# Patient Record
Sex: MALE | Race: WHITE | NOT HISPANIC OR LATINO | Employment: OTHER | URBAN - NONMETROPOLITAN AREA
[De-identification: names, ages, dates, MRNs, and addresses within clinical notes are randomized per-mention and may not be internally consistent; named-entity substitution may affect disease eponyms.]

---

## 2023-10-27 RX ORDER — HYDROCHLOROTHIAZIDE 12.5 MG/1
12.5 TABLET ORAL DAILY
COMMUNITY

## 2023-10-27 RX ORDER — METOPROLOL TARTRATE 100 MG/1
100 TABLET ORAL 2 TIMES DAILY
COMMUNITY

## 2023-10-27 RX ORDER — FAMOTIDINE 20 MG/1
20 TABLET, FILM COATED ORAL 2 TIMES DAILY
COMMUNITY
Start: 2023-08-15

## 2023-10-27 RX ORDER — MAGNESIUM 200 MG
1 TABLET ORAL DAILY
COMMUNITY

## 2023-10-27 RX ORDER — ALBUTEROL SULFATE 90 UG/1
2 AEROSOL, METERED RESPIRATORY (INHALATION) EVERY 6 HOURS PRN
COMMUNITY

## 2023-10-27 RX ORDER — NITROGLYCERIN 0.4 MG/1
TABLET SUBLINGUAL
COMMUNITY
Start: 2022-05-17

## 2023-10-27 RX ORDER — MULTIVIT WITH MINERALS/HERBS
1 TABLET ORAL DAILY
COMMUNITY

## 2023-10-27 RX ORDER — BUPRENORPHINE HYDROCHLORIDE AND NALOXONE HYDROCHLORIDE DIHYDRATE 8; 2 MG/1; MG/1
TABLET SUBLINGUAL
COMMUNITY

## 2023-10-27 RX ORDER — ICOSAPENT ETHYL 1 G/1
2 CAPSULE ORAL 2 TIMES DAILY
COMMUNITY
Start: 2021-08-17

## 2023-10-27 RX ORDER — TIOTROPIUM BROMIDE INHALATION SPRAY 3.12 UG/1
SPRAY, METERED RESPIRATORY (INHALATION)
COMMUNITY

## 2023-10-27 RX ORDER — FLUTICASONE FUROATE AND VILANTEROL TRIFENATATE 200; 25 UG/1; UG/1
1 POWDER RESPIRATORY (INHALATION) DAILY
COMMUNITY

## 2023-10-27 RX ORDER — LOSARTAN POTASSIUM 100 MG/1
100 TABLET ORAL DAILY
COMMUNITY
End: 2024-01-11

## 2023-10-27 RX ORDER — PRAVASTATIN SODIUM 80 MG/1
80 TABLET ORAL NIGHTLY
COMMUNITY

## 2023-10-27 RX ORDER — METFORMIN HYDROCHLORIDE 500 MG/1
1 TABLET, EXTENDED RELEASE ORAL
COMMUNITY
Start: 2021-10-20

## 2023-10-27 RX ORDER — DIGOXIN 125 MCG
125 TABLET ORAL DAILY
COMMUNITY

## 2023-10-27 RX ORDER — WARFARIN 6 MG/1
TABLET ORAL
COMMUNITY

## 2023-11-12 PROBLEM — I10 HYPERTENSION: Status: ACTIVE | Noted: 2023-10-31

## 2023-11-12 PROBLEM — E78.1 PURE HYPERGLYCERIDEMIA: Status: ACTIVE | Noted: 2023-10-31

## 2023-11-12 PROBLEM — I48.19 PERSISTENT ATRIAL FIBRILLATION (MULTI): Status: ACTIVE | Noted: 2023-10-31

## 2023-11-12 PROBLEM — E11.69 TYPE 2 DIABETES MELLITUS WITH OTHER SPECIFIED COMPLICATION (MULTI): Status: ACTIVE | Noted: 2023-10-31

## 2023-11-12 PROBLEM — F17.200 CURRENT SMOKER: Status: ACTIVE | Noted: 2023-10-31

## 2023-11-12 PROBLEM — I50.9 CONGESTIVE HEART FAILURE (MULTI): Status: ACTIVE | Noted: 2023-10-31

## 2023-11-12 PROBLEM — E66.9 OBESITY: Status: ACTIVE | Noted: 2023-10-31

## 2023-11-12 PROBLEM — E78.5 HYPERLIPIDEMIA: Status: ACTIVE | Noted: 2023-10-31

## 2023-11-12 PROBLEM — J44.9 CHRONIC OBSTRUCTIVE PULMONARY DISEASE (MULTI): Status: ACTIVE | Noted: 2023-10-31

## 2023-11-12 PROBLEM — I25.10 CORONARY ARTERY DISEASE: Status: ACTIVE | Noted: 2023-10-31

## 2023-11-12 PROBLEM — G47.30 SLEEP APNEA: Status: ACTIVE | Noted: 2023-11-12

## 2023-11-12 PROBLEM — I25.10 CAD, MULTIPLE VESSEL: Status: ACTIVE | Noted: 2023-11-12

## 2023-11-12 PROBLEM — R06.02 SHORTNESS OF BREATH: Status: ACTIVE | Noted: 2023-11-12

## 2023-11-12 RX ORDER — ASCORBIC ACID 125 MG
1 TABLET,CHEWABLE ORAL EVERY 12 HOURS
COMMUNITY
End: 2023-11-14

## 2023-11-14 ENCOUNTER — OFFICE VISIT (OUTPATIENT)
Dept: CARDIOLOGY | Facility: CLINIC | Age: 63
End: 2023-11-14
Payer: COMMERCIAL

## 2023-11-14 VITALS
BODY MASS INDEX: 37.25 KG/M2 | WEIGHT: 275 LBS | DIASTOLIC BLOOD PRESSURE: 78 MMHG | HEART RATE: 93 BPM | SYSTOLIC BLOOD PRESSURE: 120 MMHG | HEIGHT: 72 IN

## 2023-11-14 DIAGNOSIS — J44.9 CHRONIC OBSTRUCTIVE PULMONARY DISEASE, UNSPECIFIED COPD TYPE (MULTI): ICD-10-CM

## 2023-11-14 DIAGNOSIS — I48.20 CHRONIC ATRIAL FIBRILLATION (MULTI): ICD-10-CM

## 2023-11-14 DIAGNOSIS — I25.10 CAD, MULTIPLE VESSEL: Primary | ICD-10-CM

## 2023-11-14 DIAGNOSIS — F17.200 CURRENT SMOKER: ICD-10-CM

## 2023-11-14 DIAGNOSIS — I50.9 CHRONIC CONGESTIVE HEART FAILURE, UNSPECIFIED HEART FAILURE TYPE (MULTI): ICD-10-CM

## 2023-11-14 DIAGNOSIS — E78.2 MIXED HYPERLIPIDEMIA: ICD-10-CM

## 2023-11-14 DIAGNOSIS — R06.02 SHORTNESS OF BREATH: ICD-10-CM

## 2023-11-14 DIAGNOSIS — G47.33 OBSTRUCTIVE SLEEP APNEA SYNDROME: ICD-10-CM

## 2023-11-14 PROBLEM — I48.19 PERSISTENT ATRIAL FIBRILLATION (MULTI): Status: RESOLVED | Noted: 2023-10-31 | Resolved: 2023-11-14

## 2023-11-14 PROBLEM — E78.1 PURE HYPERGLYCERIDEMIA: Status: RESOLVED | Noted: 2023-10-31 | Resolved: 2023-11-14

## 2023-11-14 PROCEDURE — 93000 ELECTROCARDIOGRAM COMPLETE: CPT | Performed by: INTERNAL MEDICINE

## 2023-11-14 PROCEDURE — 3008F BODY MASS INDEX DOCD: CPT | Performed by: INTERNAL MEDICINE

## 2023-11-14 PROCEDURE — 3074F SYST BP LT 130 MM HG: CPT | Performed by: INTERNAL MEDICINE

## 2023-11-14 PROCEDURE — 99214 OFFICE O/P EST MOD 30 MIN: CPT | Performed by: INTERNAL MEDICINE

## 2023-11-14 PROCEDURE — 4010F ACE/ARB THERAPY RXD/TAKEN: CPT | Performed by: INTERNAL MEDICINE

## 2023-11-14 PROCEDURE — 3078F DIAST BP <80 MM HG: CPT | Performed by: INTERNAL MEDICINE

## 2023-11-14 PROCEDURE — 4004F PT TOBACCO SCREEN RCVD TLK: CPT | Performed by: INTERNAL MEDICINE

## 2023-11-14 ASSESSMENT — ENCOUNTER SYMPTOMS
DIZZINESS: 1
SHORTNESS OF BREATH: 1

## 2023-11-14 NOTE — PROGRESS NOTES
Subjective   Sony Wen is a 63 y.o. male       Chief Complaint    Follow-up          HPI     Patient is here for follow-up continue management for coronary artery disease with remote PCI, chronic atrial fibrillation, tobacco use and hyperlipidemia.  Since last time I saw him he denies any change in cardiac status or symptoms.  Continues to have shortness of breath.  Continues to smoke and he did not do his lab work as ordered.  He denies any change in cardiac status or symptoms.    ASSESSMENT:      1. Coronary artery disease with prior PCI to the right coronary Coronary artery with known disease affecting other coronary beds. Stable no chest pain  2. Chronic atrial fibrillation elected for heart rate control on long-term anticoagulation  3. Active tobacco use and chronic obstructive pulmonary disease.  4. Hyperlipidemia, did not do his lab work as ordered again  5. Sleep apnea, compliant with CPAP.  6. Obesity out significant weight changes  7. Hypertension controlled  8. Diabetes mellitus          RECOMMENDATION:      1. The patient was counseled regarding smoking cessation.  2. Continue present medical therapy  3. The patient was advised to lose weight to exercise.  4. The patient elected in the past to continue with heart rate control long-term anticoagulation  6. We'll see him back in the office in 6 month follow-up.  And I emphasized to him to review the importance of repeating his lab work  7. I advised the patient to notify me change in cardiac status or symptoms   Review of Systems   Respiratory:  Positive for shortness of breath.    Neurological:  Positive for dizziness.   All other systems reviewed and are negative.         Visit Vitals  /78 (BP Location: Left arm, Patient Position: Sitting)   Pulse 93   Ht 1.829 m (6')   Wt 125 kg (275 lb)   BMI 37.30 kg/m²   Smoking Status Every Day   BSA 2.52 m²    EKG done in office today     Objective   Physical Exam  Constitutional:       Appearance:  Normal appearance. He is normal weight.   HENT:      Nose: Nose normal.   Neck:      Vascular: No carotid bruit.   Cardiovascular:      Rate and Rhythm: Normal rate.      Pulses: Normal pulses.      Heart sounds: Normal heart sounds.   Pulmonary:      Effort: Pulmonary effort is normal.   Abdominal:      General: Bowel sounds are normal.      Palpations: Abdomen is soft.   Genitourinary:     Rectum: Normal.   Musculoskeletal:         General: Normal range of motion.      Cervical back: Normal range of motion.      Right lower leg: No edema.      Left lower leg: No edema.   Skin:     General: Skin is warm and dry.   Neurological:      General: No focal deficit present.      Mental Status: He is alert.   Psychiatric:         Mood and Affect: Mood normal.         Behavior: Behavior normal.         Thought Content: Thought content normal.         Judgment: Judgment normal.         Current Medications    Current Outpatient Medications:     albuterol 90 mcg/actuation inhaler, Inhale 2 puffs every 6 hours if needed., Disp: , Rfl:     Breo Ellipta 200-25 mcg/dose inhaler, Inhale 1 puff once daily., Disp: , Rfl:     buprenorphine-naloxone (Suboxone) 8-2 mg SL tablet, DISSOLVE 1 (ONE) & ONE-HALF TABLETS UNDER THE TONGUE EVERY DAY for 30 days, Disp: , Rfl:     digoxin (Lanoxin) 125 MCG tablet, Take 1 tablet (125 mcg) by mouth once daily., Disp: , Rfl:     famotidine (Pepcid) 20 mg tablet, Take 1 tablet (20 mg) by mouth 2 times a day., Disp: , Rfl:     hydroCHLOROthiazide (HYDRODiuril) 12.5 mg tablet, Take 1 tablet (12.5 mg) by mouth once daily., Disp: , Rfl:     icosapent ethyL (Vascepa) 1 gram capsule, Take 2 capsules (2 g) by mouth 2 times a day., Disp: , Rfl:     losartan (Cozaar) 100 mg tablet, Take 1 tablet (100 mg) by mouth once daily., Disp: , Rfl:     magnesium 200 mg tablet, Take 1 tablet (200 mg) by mouth once daily., Disp: , Rfl:     metFORMIN  mg 24 hr tablet, 1 tablet (500 mg) 2 times a day with meals.,  Disp: , Rfl:     metoprolol tartrate (Lopressor) 100 mg tablet, Take 1 tablet (100 mg) by mouth 2 times a day., Disp: , Rfl:     nitroglycerin (Nitrostat) 0.4 mg SL tablet, Place under the tongue., Disp: , Rfl:     pravastatin (Pravachol) 80 mg tablet, Take 1 tablet (80 mg) by mouth once daily at bedtime., Disp: , Rfl:     Spiriva Respimat 2.5 mcg/actuation inhaler, INHALE 2 PUFFS BY MOUTH ONCE A DAY, Disp: , Rfl:     vitamin B complex (Vitamins B Complex) tablet, Take 1 tablet by mouth once daily., Disp: , Rfl:     warfarin (Coumadin) 6 mg tablet, Take by mouth., Disp: , Rfl:                      Assessment/Plan   1. CAD, multiple vessel  Alanine Aminotransferase    Aspartate Aminotransferase    Basic Metabolic Panel    Lipid Panel    Follow Up In Cardiology      2. Chronic atrial fibrillation (CMS/HCC)  Basic Metabolic Panel    Follow Up In Cardiology    ECG 12 Lead      3. Chronic congestive heart failure, unspecified heart failure type (CMS/HCC)  Basic Metabolic Panel    Follow Up In Cardiology      4. Mixed hyperlipidemia  Alanine Aminotransferase    Aspartate Aminotransferase    Lipid Panel      5. BMI 37.0-37.9, adult        6. Chronic obstructive pulmonary disease, unspecified COPD type (CMS/HCC)        7. Shortness of breath        8. Obstructive sleep apnea syndrome        9. Current smoker

## 2023-11-14 NOTE — PATIENT INSTRUCTIONS
Please bring all medicines, vitamins, and herbal supplements with you when you come to the office.    Prescriptions will not be filled unless you are compliant with your follow up appointments or have a follow up appointment scheduled as per instruction of your physician. Refills should be requested at the time of your visit.     EKG done in office today     Lab work  8 months

## 2024-01-11 DIAGNOSIS — I10 ESSENTIAL HYPERTENSION: ICD-10-CM

## 2024-01-11 RX ORDER — LOSARTAN POTASSIUM 100 MG/1
100 TABLET ORAL DAILY
Qty: 90 TABLET | Refills: 3 | Status: SHIPPED | OUTPATIENT
Start: 2024-01-11

## 2024-01-26 ENCOUNTER — APPOINTMENT (OUTPATIENT)
Dept: CARDIOLOGY | Facility: CLINIC | Age: 64
End: 2024-01-26
Payer: COMMERCIAL

## 2024-07-19 DIAGNOSIS — E78.2 MIXED HYPERLIPIDEMIA: ICD-10-CM

## 2024-07-19 DIAGNOSIS — I48.20 CHRONIC ATRIAL FIBRILLATION (MULTI): Primary | ICD-10-CM

## 2024-07-19 RX ORDER — WARFARIN SODIUM 4 MG/1
TABLET ORAL
Qty: 240 TABLET | Refills: 0 | Status: SHIPPED | OUTPATIENT
Start: 2024-07-19

## 2024-07-19 RX ORDER — ICOSAPENT ETHYL 1000 MG/1
2 CAPSULE ORAL 2 TIMES DAILY
Qty: 360 CAPSULE | Refills: 3 | Status: SHIPPED | OUTPATIENT
Start: 2024-07-19

## 2024-07-23 ENCOUNTER — APPOINTMENT (OUTPATIENT)
Dept: CARDIOLOGY | Facility: CLINIC | Age: 64
End: 2024-07-23
Payer: COMMERCIAL

## 2024-09-01 DIAGNOSIS — I10 ESSENTIAL HYPERTENSION: ICD-10-CM

## 2024-09-01 DIAGNOSIS — I50.9 CHRONIC CONGESTIVE HEART FAILURE, UNSPECIFIED HEART FAILURE TYPE (MULTI): ICD-10-CM

## 2024-09-03 RX ORDER — HYDROCHLOROTHIAZIDE 12.5 MG/1
12.5 TABLET ORAL DAILY
Qty: 90 TABLET | Refills: 0 | Status: SHIPPED | OUTPATIENT
Start: 2024-09-03

## 2024-09-17 ENCOUNTER — TELEPHONE (OUTPATIENT)
Dept: CARDIOLOGY | Facility: CLINIC | Age: 64
End: 2024-09-17

## 2024-09-17 ENCOUNTER — APPOINTMENT (OUTPATIENT)
Dept: CARDIOLOGY | Facility: CLINIC | Age: 64
End: 2024-09-17
Payer: COMMERCIAL

## 2024-09-17 VITALS
BODY MASS INDEX: 35.81 KG/M2 | DIASTOLIC BLOOD PRESSURE: 86 MMHG | HEIGHT: 72 IN | WEIGHT: 264.4 LBS | HEART RATE: 78 BPM | SYSTOLIC BLOOD PRESSURE: 126 MMHG

## 2024-09-17 DIAGNOSIS — I48.20 CHRONIC ATRIAL FIBRILLATION (MULTI): ICD-10-CM

## 2024-09-17 DIAGNOSIS — R06.02 SHORTNESS OF BREATH: ICD-10-CM

## 2024-09-17 DIAGNOSIS — G47.33 OBSTRUCTIVE SLEEP APNEA SYNDROME: ICD-10-CM

## 2024-09-17 DIAGNOSIS — I25.10 CAD, MULTIPLE VESSEL: Primary | ICD-10-CM

## 2024-09-17 DIAGNOSIS — I50.9 CHRONIC CONGESTIVE HEART FAILURE, UNSPECIFIED HEART FAILURE TYPE: ICD-10-CM

## 2024-09-17 DIAGNOSIS — J44.9 CHRONIC OBSTRUCTIVE PULMONARY DISEASE, UNSPECIFIED COPD TYPE (MULTI): ICD-10-CM

## 2024-09-17 DIAGNOSIS — I73.9 PVD (PERIPHERAL VASCULAR DISEASE) (CMS-HCC): ICD-10-CM

## 2024-09-17 DIAGNOSIS — E78.2 MIXED HYPERLIPIDEMIA: ICD-10-CM

## 2024-09-17 DIAGNOSIS — I10 ESSENTIAL HYPERTENSION: ICD-10-CM

## 2024-09-17 DIAGNOSIS — F17.200 CURRENT SMOKER: ICD-10-CM

## 2024-09-17 PROBLEM — E66.9 OBESITY: Status: RESOLVED | Noted: 2023-10-31 | Resolved: 2024-09-17

## 2024-09-17 LAB
Lab: 0.5 NG/ML (ref 0.5–1.9)
NON-UH HIE ALANINE AMINOTRANSFERASE:CCNC:PT:SER/PLAS:QN:NO ADDITION OF P-5': 47 INT._UNIT/L (ref 6–46)
NON-UH HIE ANION GAP:SCNC:PT:SER/PLAS:QN:: 11 MEQ/L (ref 6–16)
NON-UH HIE ASPARTATE AMINOTRANSFERASE:CCNC:PT:SER/PLAS:QN:: 58 INT._UNIT/L (ref 5–43)
NON-UH HIE CALCIUM:MCNC:PT:SER/PLAS:QN:: 9.1 MG/DL (ref 8.9–11.1)
NON-UH HIE CARBON DIOXIDE:SCNC:PT:SER/PLAS:QN:: 27 MMOL/L (ref 21–31)
NON-UH HIE CHLORIDE:SCNC:PT:SER/PLAS:QN:: 101 MMOL/L (ref 101–111)
NON-UH HIE CHOLESTEROL.IN VLDL:MCNC:PT:SER/PLAS:QN:CALCULATED: ABNORMAL MG/DL (ref 7–40)
NON-UH HIE CREATININE:MCNC:PT:SER/PLAS:QN:: 0.8 MG/DL (ref 0.5–1.3)
NON-UH HIE EGFR: 98 ML/MIN/1.73 M2
NON-UH HIE GLUCOSE:MCNC:PT:SER/PLAS:QN:: 125 MG/DL (ref 55–199)
NON-UH HIE POTASSIUM:SCNC:PT:SER/PLAS:QN:: 4 MMOL/L (ref 3.5–5.3)
NON-UH HIE SODIUM:SCNC:PT:SER/PLAS:QN:: 135 MMOL/L (ref 135–145)
NON-UH HIE UREA NITROGEN/CREATININE:MRTO:PT:SER/PLAS:QN:: 21 NO UNITS (ref 10–20)
NON-UH HIE UREA NITROGEN:MCNC:PT:SER/PLAS:QN:: 17 MG/DL (ref 5–21)

## 2024-09-17 PROCEDURE — 99214 OFFICE O/P EST MOD 30 MIN: CPT | Performed by: INTERNAL MEDICINE

## 2024-09-17 PROCEDURE — 1159F MED LIST DOCD IN RCRD: CPT | Performed by: INTERNAL MEDICINE

## 2024-09-17 PROCEDURE — 4010F ACE/ARB THERAPY RXD/TAKEN: CPT | Performed by: INTERNAL MEDICINE

## 2024-09-17 PROCEDURE — 3079F DIAST BP 80-89 MM HG: CPT | Performed by: INTERNAL MEDICINE

## 2024-09-17 PROCEDURE — 1160F RVW MEDS BY RX/DR IN RCRD: CPT | Performed by: INTERNAL MEDICINE

## 2024-09-17 PROCEDURE — 3074F SYST BP LT 130 MM HG: CPT | Performed by: INTERNAL MEDICINE

## 2024-09-17 PROCEDURE — 3008F BODY MASS INDEX DOCD: CPT | Performed by: INTERNAL MEDICINE

## 2024-09-17 PROCEDURE — 4004F PT TOBACCO SCREEN RCVD TLK: CPT | Performed by: INTERNAL MEDICINE

## 2024-09-17 RX ORDER — BISMUTH SUBSALICYLATE 262 MG
1 TABLET,CHEWABLE ORAL DAILY
COMMUNITY

## 2024-09-17 RX ORDER — ASPIRIN 81 MG/1
81 TABLET ORAL DAILY
COMMUNITY

## 2024-09-17 NOTE — TELEPHONE ENCOUNTER
Patient returned call. Verbalized understanding. Order to Dr. Kaylyn Osorio MD to sign and would like mailed to home when signed.

## 2024-09-17 NOTE — PATIENT INSTRUCTIONS
Please bring all medicines, vitamins, and herbal supplements with you when you come to the office.    Prescriptions will not be filled unless you are compliant with your follow up appointments or have a follow up appointment scheduled as per instruction of your physician. Refills should be requested at the time of your visit.     BMI was above normal measurement. Current weight: 120 kg (264 lb 6.4 oz)  Weight change since last visit (-) denotes wt loss -10.6 lbs   Weight loss needed to achieve BMI 25: 80.5 Lbs  Weight loss needed to achieve BMI 30: 43.7 Lbs  Provided instructions on dietary changes  Provided instructions on exercise.    7-8 months with EKG  Lab work

## 2024-09-17 NOTE — LETTER
September 17, 2024     Anuel Oliva DO  [address]  [place] [address]    Patient: Sony Wen   YOB: 1960   Date of Visit: 9/17/2024       Dear Dr. Anuel Oliva DO:    Thank you for referring Sony Wen to me for evaluation. Below are my notes for this consultation.  If you have questions, please do not hesitate to call me. I look forward to following your patient along with you.       Sincerely,     Kaylyn Osorio MD      CC: No Recipients  ______________________________________________________________________________________    Subjective   Sony Wen is a 64 y.o. male       Chief Complaint    Follow-up          HPI        Patient is here for follow-up continue management for coronary artery disease, chronic atrial fibrillation, tobacco use hypertension and hyperlipidemia.  Since last time I saw him he denies any cardiac complaint.  He denies chest pain, palpitation, lightheadedness, dizziness or syncope.  He remains active and described functional class II.  He continues to smoke.  Again he failed to do his lab work and cholesterol as was advised.  He is on Coumadin his INR result noted and reviewed with him.    ASSESSMENT:      1. Coronary artery disease with prior PCI to the right coronary Coronary artery with known disease affecting other coronary beds. Stable no chest pain  2. Chronic atrial fibrillation elected for heart rate control on long-term anticoagulation  3. Active tobacco use and chronic obstructive pulmonary disease.  4. Hyperlipidemia, with history of intolerance to the potent statin but able to tolerate pravastatin.  But again he did not do his lab work as he was ordered  5. Sleep apnea, compliant with CPAP.  6. Obesity with recent weight loss  7. Hypertension controlled  8. Diabetes mellitus  9.  Long-term anticoagulation well-tolerated with no side effect           RECOMMENDATION:      1. The patient was counseled regarding smoking  cessation.  2. Continue present medical therapy  3. The patient was advised to lose weight to exercise.  4. The patient elected in the past to continue with heart rate control long-term anticoagulation  6. We'll see him back in the office in 8 months with an EKG and I advised the patient to do his lab work including digoxin level  7. I advised the patient to notify me change in cardiac status or symptoms   Review of Systems   All other systems reviewed and are negative.           Vitals:    09/17/24 0847   BP: 126/86   BP Location: Left arm   Patient Position: Sitting   Pulse: 78   Weight: 120 kg (264 lb 6.4 oz)   Height: 1.829 m (6')        Objective   Physical Exam  Constitutional:       Appearance: Normal appearance.   HENT:      Nose: Nose normal.   Neck:      Vascular: No carotid bruit.   Cardiovascular:      Rate and Rhythm: Normal rate. Rhythm regularly irregular.      Pulses: Normal pulses.      Heart sounds: Normal heart sounds.   Pulmonary:      Effort: Pulmonary effort is normal.   Abdominal:      General: Bowel sounds are normal.      Palpations: Abdomen is soft.   Musculoskeletal:         General: Normal range of motion.      Cervical back: Normal range of motion.      Right lower leg: No edema.      Left lower leg: No edema.   Skin:     General: Skin is warm and dry.   Neurological:      General: No focal deficit present.      Mental Status: He is alert.   Psychiatric:         Mood and Affect: Mood normal.         Behavior: Behavior normal.         Thought Content: Thought content normal.         Judgment: Judgment normal.         Allergies  Penicillins and Statins-hmg-coa reductase inhibitors     Current Medications    Current Outpatient Medications:   •  albuterol 90 mcg/actuation inhaler, Inhale 2 puffs every 6 hours if needed., Disp: , Rfl:   •  Breo Ellipta 200-25 mcg/dose inhaler, Inhale 1 puff once daily., Disp: , Rfl:   •  buprenorphine-naloxone (Suboxone) 8-2 mg SL tablet, DISSOLVE 1 (ONE) &  ONE-HALF TABLETS UNDER THE TONGUE EVERY DAY for 30 days, Disp: , Rfl:   •  digoxin (Lanoxin) 125 MCG tablet, Take 1 tablet (125 mcg) by mouth once daily., Disp: , Rfl:   •  famotidine (Pepcid) 20 mg tablet, Take 1 tablet (20 mg) by mouth 2 times a day., Disp: , Rfl:   •  hydroCHLOROthiazide (Microzide) 12.5 mg tablet, TAKE 1 TABLET BY MOUTH EVERY DAY, Disp: 90 tablet, Rfl: 0  •  Jantoven 4 mg tablet, TAKE 3 TABLETS BY MOUTH ON SUNDAY AND THURSDAY, THEN TAKE 2 AND 1/2 TABLET BY MOUTH ALL OTHER DAYS OR AS DIRECTED (Patient taking differently: Take by mouth as directed by [facility]), Disp: 240 tablet, Rfl: 0  •  losartan (Cozaar) 100 mg tablet, TAKE 1 TABLET BY MOUTH DAILY, Disp: 90 tablet, Rfl: 3  •  magnesium 200 mg tablet, Take 1 tablet (200 mg) by mouth once daily., Disp: , Rfl:   •  metFORMIN  mg 24 hr tablet, 1 tablet (500 mg) 2 times daily (morning and late afternoon)., Disp: , Rfl:   •  metoprolol tartrate (Lopressor) 100 mg tablet, Take 1 tablet (100 mg) by mouth 2 times a day., Disp: , Rfl:   •  multivitamin tablet, Take 1 tablet by mouth once daily., Disp: , Rfl:   •  nitroglycerin (Nitrostat) 0.4 mg SL tablet, Place under the tongue., Disp: , Rfl:   •  pravastatin (Pravachol) 80 mg tablet, Take 1 tablet (80 mg) by mouth once daily at bedtime., Disp: , Rfl:   •  Spiriva Respimat 2.5 mcg/actuation inhaler, INHALE 2 PUFFS BY MOUTH ONCE A DAY, Disp: , Rfl:   •  Vascepa 1 gram capsule, Take 2 capsules by mouth twice daily, Disp: 360 capsule, Rfl: 3  •  vitamin B complex (Vitamins B Complex) tablet, Take 1 tablet by mouth once daily., Disp: , Rfl:   •  aspirin 81 mg EC tablet, Take 1 tablet (81 mg) by mouth once daily., Disp: , Rfl:                      Assessment/Plan   1. Chronic atrial fibrillation (Multi)  Follow Up In Cardiology      2. CAD, multiple vessel  Follow Up In Cardiology      3. Chronic congestive heart failure, unspecified heart failure type (Multi)  Follow Up In Cardiology       4. Mixed hyperlipidemia        5. Essential hypertension        6. BMI 35.0-35.9,adult        7. Shortness of breath        8. Chronic obstructive pulmonary disease, unspecified COPD type (Multi)        9. Obstructive sleep apnea syndrome        10. Current smoker                 Scribe Attestation  By signing my name below, Mayi VAZQUEZ LPN, Scribe   attest that this documentation has been prepared under the direction and in the presence of Kaylyn Osorio MD.     Provider Attestation - Scribe documentation    All medical record entries made by the Scribe were at my direction and personally dictated by me. I have reviewed the chart and agree that the record accurately reflects my personal performance of the history, physical exam, discussion and plan.

## 2024-09-17 NOTE — TELEPHONE ENCOUNTER
"Result Communication    Resulted Orders   [facility] HIE BMP   Result Value Ref Range    [facility] HIE GLUCOSE:MCNC:PT:SER/PLAS:QN: 125 55 - 199 mg/dL    [facility] HIE UREA NITROGEN:MCNC:PT:SER/PLAS:QN: 17 5 - 21 mg/dL    [facility] H[facility] CREATININE:MCNC:PT:SER/PLAS:QN: 0.8 0.5 - 1.3 mg/dL    [facility] HIE UREA NITROGEN/CREATININE:MRTO:PT:SER/PLAS:QN: 21 (H) 10 - 20 No Units    [facility] HIE CALCIUM:MCNC:PT:SER/PLAS:QN: 9.1 8.9 - 11.1 mg/dL    [facility] HIE SODIUM:SCNC:PT:SER/PLAS:QN: 135 135 - 145 mmol/L    [facility] HIE POTASSIUM:SCNC:PT:SER/PLAS:QN: 4.0 3.5 - 5.3 mmol/L    [facility] HIE CHLORIDE:SCNC:PT:SER/PLAS:QN: 101 101 - 111 mmol/L    [facility] HIE CARBON DIOXIDE:SCNC:PT:SER/PLAS:QN: 27 21 - 31 mmol/L    [facility] HIE ANION GAP:SCNC:PT:SER/PLAS:QN: 11 6 - 16 mEq/L   [facility] HIE Lipid Panel   Result Value Ref Range    [facility] HIE CHOLESTEROL.IN VLDL:MCNC:PT:SER/PLAS:QN:CALCULATED UTC (A) 7 - 40 mg/dL      Comment:      'UNABLE TO REPORT. TRIG > 400 mg/dl'Result verified by [employer].  Performed result \"UTC\" (Unable to Calculate) was sent as an Alpha code due the inability to calculate a valid numeric value.   [facility] HIE ALT   Result Value Ref Range    [facility] HIE ALANINE AMINOTRANSFERASE:CCNC:PT:SER/PLAS:QN:NO ADDITION OF P-5' 47 (H) 6 - 46 Int._Unit/L   [facility] HIE AST   Result Value Ref Range    [facility] HIE ASPARTATE AMINOTRANSFERASE:CCNC:PT:SER/PLAS:QN: 58 (H) 5 - 43 Int._Unit/L   [facility] HIE eGFR   Result Value Ref Range    [facility] HIE EGFR 98 >=59 mL/min/1.73 m2         "

## 2024-09-17 NOTE — TELEPHONE ENCOUNTER
----- Message from Kaylyn Osorio sent at 9/17/2024 11:38 AM EDT -----  Advise lipid looks good.  Liver function test slightly elevated.  Advised to avoid alcohol and Tylenol.  Repeat AST and ALT in 8 weeks  ----- Message -----  From: Tommie Ye - Lab Results In  Sent: 9/17/2024  11:35 AM EDT  To: Kaylyn Osorio MD

## 2024-09-17 NOTE — PROGRESS NOTES
Subjective   Sony Wen is a 64 y.o. male       Chief Complaint    Follow-up          HPI        Patient is here for follow-up continue management for coronary artery disease, chronic atrial fibrillation, tobacco use hypertension and hyperlipidemia.  Since last time I saw him he denies any cardiac complaint.  He denies chest pain, palpitation, lightheadedness, dizziness or syncope.  He remains active and described functional class II.  He continues to smoke.  Again he failed to do his lab work and cholesterol as was advised.  He is on Coumadin his INR result noted and reviewed with him.  He reported he recently underwent revascularization for his lower extremity by his vascular surgeon    ASSESSMENT:      1. Coronary artery disease with prior PCI to the right coronary Coronary artery with known disease affecting other coronary beds. Stable no chest pain  2. Chronic atrial fibrillation elected for heart rate control on long-term anticoagulation  3. Active tobacco use and chronic obstructive pulmonary disease.  4. Hyperlipidemia, with history of intolerance to the potent statin but able to tolerate pravastatin.  But again he did not do his lab work as he was ordered  5. Sleep apnea, compliant with CPAP.  6. Obesity with recent weight loss  7. Hypertension controlled  8. Diabetes mellitus  9.  Long-term anticoagulation well-tolerated with no side effect  10.  Peripheral vascular disease.  Recently underwent revascularization by his vascular surgeon           RECOMMENDATION:      1. The patient was counseled regarding smoking cessation.  2. Continue present medical therapy  3. The patient was advised to lose weight to exercise.  4. The patient elected in the past to continue with heart rate control long-term anticoagulation  6. We'll see him back in the office in 8 months with an EKG and I advised the patient to do his lab work including digoxin level  7. I advised the patient to notify me change in cardiac status  or symptoms   Review of Systems   All other systems reviewed and are negative.           Vitals:    09/17/24 0847   BP: 126/86   BP Location: Left arm   Patient Position: Sitting   Pulse: 78   Weight: 120 kg (264 lb 6.4 oz)   Height: 1.829 m (6')        Objective   Physical Exam  Constitutional:       Appearance: Normal appearance.   HENT:      Nose: Nose normal.   Neck:      Vascular: No carotid bruit.   Cardiovascular:      Rate and Rhythm: Normal rate. Rhythm regularly irregular.      Pulses: Normal pulses.      Heart sounds: Normal heart sounds.   Pulmonary:      Effort: Pulmonary effort is normal.   Abdominal:      General: Bowel sounds are normal.      Palpations: Abdomen is soft.   Musculoskeletal:         General: Normal range of motion.      Cervical back: Normal range of motion.      Right lower leg: No edema.      Left lower leg: No edema.   Skin:     General: Skin is warm and dry.   Neurological:      General: No focal deficit present.      Mental Status: He is alert.   Psychiatric:         Mood and Affect: Mood normal.         Behavior: Behavior normal.         Thought Content: Thought content normal.         Judgment: Judgment normal.         Allergies  Penicillins and Statins-hmg-coa reductase inhibitors     Current Medications    Current Outpatient Medications:     albuterol 90 mcg/actuation inhaler, Inhale 2 puffs every 6 hours if needed., Disp: , Rfl:     Breo Ellipta 200-25 mcg/dose inhaler, Inhale 1 puff once daily., Disp: , Rfl:     buprenorphine-naloxone (Suboxone) 8-2 mg SL tablet, DISSOLVE 1 (ONE) & ONE-HALF TABLETS UNDER THE TONGUE EVERY DAY for 30 days, Disp: , Rfl:     digoxin (Lanoxin) 125 MCG tablet, Take 1 tablet (125 mcg) by mouth once daily., Disp: , Rfl:     famotidine (Pepcid) 20 mg tablet, Take 1 tablet (20 mg) by mouth 2 times a day., Disp: , Rfl:     hydroCHLOROthiazide (Microzide) 12.5 mg tablet, TAKE 1 TABLET BY MOUTH EVERY DAY, Disp: 90 tablet, Rfl: 0    Jantoven 4 mg tablet,  TAKE 3 TABLETS BY MOUTH ON SUNDAY AND THURSDAY, THEN TAKE 2 AND 1/2 TABLET BY MOUTH ALL OTHER DAYS OR AS DIRECTED (Patient taking differently: Take by mouth as directed by [facility]), Disp: 240 tablet, Rfl: 0    losartan (Cozaar) 100 mg tablet, TAKE 1 TABLET BY MOUTH DAILY, Disp: 90 tablet, Rfl: 3    magnesium 200 mg tablet, Take 1 tablet (200 mg) by mouth once daily., Disp: , Rfl:     metFORMIN  mg 24 hr tablet, 1 tablet (500 mg) 2 times daily (morning and late afternoon)., Disp: , Rfl:     metoprolol tartrate (Lopressor) 100 mg tablet, Take 1 tablet (100 mg) by mouth 2 times a day., Disp: , Rfl:     multivitamin tablet, Take 1 tablet by mouth once daily., Disp: , Rfl:     nitroglycerin (Nitrostat) 0.4 mg SL tablet, Place under the tongue., Disp: , Rfl:     pravastatin (Pravachol) 80 mg tablet, Take 1 tablet (80 mg) by mouth once daily at bedtime., Disp: , Rfl:     Spiriva Respimat 2.5 mcg/actuation inhaler, INHALE 2 PUFFS BY MOUTH ONCE A DAY, Disp: , Rfl:     Vascepa 1 gram capsule, Take 2 capsules by mouth twice daily, Disp: 360 capsule, Rfl: 3    vitamin B complex (Vitamins B Complex) tablet, Take 1 tablet by mouth once daily., Disp: , Rfl:     aspirin 81 mg EC tablet, Take 1 tablet (81 mg) by mouth once daily., Disp: , Rfl:                      Assessment/Plan   1. CAD, multiple vessel  Follow Up In Cardiology    Lipid Panel    Follow Up In Cardiology      2. Chronic atrial fibrillation (Multi)  Follow Up In Cardiology    Digoxin      3. Chronic congestive heart failure, unspecified heart failure type (Multi)  Follow Up In Cardiology      4. Mixed hyperlipidemia  Lipid Panel      5. Essential hypertension  Digoxin      6. BMI 35.0-35.9,adult        7. Shortness of breath        8. Chronic obstructive pulmonary disease, unspecified COPD type (Multi)        9. Obstructive sleep apnea syndrome        10. Current smoker        11. PVD (peripheral vascular disease) (CMS-[facility])                 Scribe  Attestation  By signing my name below, IMayi LPN, Scribe   attest that this documentation has been prepared under the direction and in the presence of Kaylyn Osorio MD.     Provider Attestation - Scribe documentation    All medical record entries made by the Scribe were at my direction and personally dictated by me. I have reviewed the chart and agree that the record accurately reflects my personal performance of the history, physical exam, discussion and plan.

## 2024-10-24 DIAGNOSIS — I48.20 CHRONIC ATRIAL FIBRILLATION (MULTI): ICD-10-CM

## 2024-10-24 RX ORDER — WARFARIN 4 MG/1
TABLET ORAL
Qty: 240 TABLET | Refills: 0 | Status: SHIPPED | OUTPATIENT
Start: 2024-10-24

## 2025-01-24 DIAGNOSIS — I10 ESSENTIAL HYPERTENSION: ICD-10-CM

## 2025-01-24 DIAGNOSIS — E78.2 MIXED HYPERLIPIDEMIA: ICD-10-CM

## 2025-01-24 DIAGNOSIS — I48.20 CHRONIC ATRIAL FIBRILLATION (MULTI): ICD-10-CM

## 2025-01-24 DIAGNOSIS — I50.9 CHRONIC CONGESTIVE HEART FAILURE, UNSPECIFIED HEART FAILURE TYPE: ICD-10-CM

## 2025-01-25 RX ORDER — DIGOXIN 125 MCG
125 TABLET ORAL DAILY
Qty: 90 TABLET | Refills: 3 | Status: SHIPPED | OUTPATIENT
Start: 2025-01-25 | End: 2026-01-25

## 2025-01-25 RX ORDER — PRAVASTATIN SODIUM 80 MG/1
80 TABLET ORAL NIGHTLY
Qty: 90 TABLET | Refills: 3 | Status: SHIPPED | OUTPATIENT
Start: 2025-01-25 | End: 2026-01-25

## 2025-01-25 RX ORDER — LOSARTAN POTASSIUM 100 MG/1
100 TABLET ORAL DAILY
Qty: 90 TABLET | Refills: 3 | Status: SHIPPED | OUTPATIENT
Start: 2025-01-25 | End: 2026-01-25

## 2025-01-25 RX ORDER — METOPROLOL TARTRATE 100 MG/1
100 TABLET ORAL 2 TIMES DAILY
Qty: 180 TABLET | Refills: 3 | Status: SHIPPED | OUTPATIENT
Start: 2025-01-25 | End: 2026-01-25

## 2025-01-25 RX ORDER — HYDROCHLOROTHIAZIDE 12.5 MG/1
12.5 TABLET ORAL DAILY
Qty: 90 TABLET | Refills: 3 | Status: SHIPPED | OUTPATIENT
Start: 2025-01-25 | End: 2026-01-25

## 2025-01-25 RX ORDER — ICOSAPENT ETHYL 1000 MG/1
2 CAPSULE ORAL 2 TIMES DAILY
Qty: 360 CAPSULE | Refills: 3 | Status: SHIPPED | OUTPATIENT
Start: 2025-01-25 | End: 2026-01-25

## 2025-01-27 DIAGNOSIS — I10 ESSENTIAL HYPERTENSION: ICD-10-CM

## 2025-01-27 DIAGNOSIS — I50.9 CHRONIC CONGESTIVE HEART FAILURE, UNSPECIFIED HEART FAILURE TYPE: ICD-10-CM

## 2025-01-27 DIAGNOSIS — I48.20 CHRONIC ATRIAL FIBRILLATION (MULTI): ICD-10-CM

## 2025-01-27 DIAGNOSIS — E78.2 MIXED HYPERLIPIDEMIA: ICD-10-CM

## 2025-01-27 RX ORDER — DIGOXIN 125 MCG
125 TABLET ORAL DAILY
Qty: 90 TABLET | Refills: 3 | Status: SHIPPED | OUTPATIENT
Start: 2025-01-27 | End: 2026-01-27

## 2025-01-27 RX ORDER — PRAVASTATIN SODIUM 80 MG/1
80 TABLET ORAL NIGHTLY
Qty: 90 TABLET | Refills: 3 | Status: SHIPPED | OUTPATIENT
Start: 2025-01-27 | End: 2026-01-27

## 2025-01-27 RX ORDER — HYDROCHLOROTHIAZIDE 12.5 MG/1
12.5 TABLET ORAL DAILY
Qty: 90 TABLET | Refills: 3 | Status: SHIPPED | OUTPATIENT
Start: 2025-01-27 | End: 2026-01-27

## 2025-01-27 RX ORDER — METOPROLOL TARTRATE 100 MG/1
100 TABLET ORAL 2 TIMES DAILY
Qty: 180 TABLET | Refills: 3 | Status: SHIPPED | OUTPATIENT
Start: 2025-01-27 | End: 2026-01-27

## 2025-01-27 NOTE — TELEPHONE ENCOUNTER
Patient states that he needs refills. Patient states was sent to [facility] , see previous note and this is note correct. Needs faxed to [phone]

## 2025-05-02 ENCOUNTER — TELEPHONE (OUTPATIENT)
Dept: CARDIOLOGY | Facility: CLINIC | Age: 65
End: 2025-05-02

## 2025-05-02 ENCOUNTER — APPOINTMENT (OUTPATIENT)
Dept: CARDIOLOGY | Facility: CLINIC | Age: 65
End: 2025-05-02
Payer: COMMERCIAL

## 2025-05-02 VITALS
HEIGHT: 72 IN | SYSTOLIC BLOOD PRESSURE: 102 MMHG | WEIGHT: 267 LBS | DIASTOLIC BLOOD PRESSURE: 68 MMHG | BODY MASS INDEX: 36.16 KG/M2 | HEART RATE: 80 BPM

## 2025-05-02 DIAGNOSIS — R06.02 SHORTNESS OF BREATH: ICD-10-CM

## 2025-05-02 DIAGNOSIS — I25.10 CAD, MULTIPLE VESSEL: Primary | ICD-10-CM

## 2025-05-02 DIAGNOSIS — E78.2 MIXED HYPERLIPIDEMIA: ICD-10-CM

## 2025-05-02 DIAGNOSIS — I73.9 PVD (PERIPHERAL VASCULAR DISEASE) (CMS-HCC): ICD-10-CM

## 2025-05-02 DIAGNOSIS — J44.9 CHRONIC OBSTRUCTIVE PULMONARY DISEASE, UNSPECIFIED COPD TYPE (MULTI): ICD-10-CM

## 2025-05-02 DIAGNOSIS — I48.20 CHRONIC ATRIAL FIBRILLATION (MULTI): ICD-10-CM

## 2025-05-02 DIAGNOSIS — I10 ESSENTIAL HYPERTENSION: ICD-10-CM

## 2025-05-02 DIAGNOSIS — F17.200 CURRENT SMOKER: ICD-10-CM

## 2025-05-02 DIAGNOSIS — G47.33 OBSTRUCTIVE SLEEP APNEA SYNDROME: ICD-10-CM

## 2025-05-02 PROBLEM — I50.9 CONGESTIVE HEART FAILURE: Status: RESOLVED | Noted: 2023-10-31 | Resolved: 2025-05-02

## 2025-05-02 PROCEDURE — G2211 COMPLEX E/M VISIT ADD ON: HCPCS | Performed by: INTERNAL MEDICINE

## 2025-05-02 PROCEDURE — 3008F BODY MASS INDEX DOCD: CPT | Performed by: INTERNAL MEDICINE

## 2025-05-02 PROCEDURE — 1159F MED LIST DOCD IN RCRD: CPT | Performed by: INTERNAL MEDICINE

## 2025-05-02 PROCEDURE — 3078F DIAST BP <80 MM HG: CPT | Performed by: INTERNAL MEDICINE

## 2025-05-02 PROCEDURE — 3074F SYST BP LT 130 MM HG: CPT | Performed by: INTERNAL MEDICINE

## 2025-05-02 PROCEDURE — 99214 OFFICE O/P EST MOD 30 MIN: CPT | Performed by: INTERNAL MEDICINE

## 2025-05-02 PROCEDURE — 1160F RVW MEDS BY RX/DR IN RCRD: CPT | Performed by: INTERNAL MEDICINE

## 2025-05-02 PROCEDURE — 4010F ACE/ARB THERAPY RXD/TAKEN: CPT | Performed by: INTERNAL MEDICINE

## 2025-05-02 RX ORDER — SEMAGLUTIDE 0.68 MG/ML
INJECTION, SOLUTION SUBCUTANEOUS
COMMUNITY

## 2025-05-02 RX ORDER — DABIGATRAN ETEXILATE 150 MG/1
150 CAPSULE ORAL 2 TIMES DAILY
Qty: 180 CAPSULE | Refills: 3 | Status: SHIPPED | OUTPATIENT
Start: 2025-05-02 | End: 2026-05-02

## 2025-05-02 ASSESSMENT — ENCOUNTER SYMPTOMS
SHORTNESS OF BREATH: 1
DYSPNEA ON EXERTION: 1

## 2025-05-02 NOTE — PROGRESS NOTES
Chief Complaint   Patient presents with    Follow-up     6m Follow up for Coronary Artery Disease        Subjective   Sony Wen is a 64 y.o. male     HPI        Patient is here for follow-up to management for history of coronary artery disease with remote PCI to the right coronary artery, chronic atrial fibrillation, shortness of breath and tobacco use.  Since last time I saw him he denies any change in cardiac status or symptoms.  He continued to smoke and complain of class II shortness of breath.  He denies chest pain, lightheadedness, dizziness or syncope.  His recent lab noted and reviewed with him.    ASSESSMENT:      1. Coronary artery disease with prior PCI to the right coronary Coronary artery and disease affecting small branches stable no chest pain  2. Chronic atrial fibrillation elected for heart rate control on long-term anticoagulation  3. Active tobacco use and chronic obstructive pulmonary disease.  4. Hyperlipidemia, with history of intolerance to the potent statin but able to tolerate pravastatin.  But again he did not do his lab work as he was ordered  5. Sleep apnea, compliant with CPAP.  6. Obesity with recent weight loss  7. Hypertension controlled  8. Diabetes mellitus  9.  Long-term anticoagulation well-tolerated with no side effect.  He is on Coumadin but interested in switching to the DOACs  10.  Peripheral vascular disease.  Recently underwent revascularization by his vascular surgeon           RECOMMENDATION:      1. The patient was counseled regarding smoking cessation.  2.  I advised the patient to switch from Coumadin to Pradaxa 150 twice daily  3. The patient was advised to lose weight to exercise.  4. The patient elected in the past to continue with heart rate control long-term anticoagulation  6. We'll see him back in the office in 8 months with an EKG   7. I advised the patient to notify me change in cardiac status or symptoms   Review of Systems   Cardiovascular:  Positive  for dyspnea on exertion.   Respiratory:  Positive for shortness of breath.    All other systems reviewed and are negative.           Vitals:    05/02/25 0957   BP: 102/68   BP Location: Right arm   Patient Position: Sitting   Pulse: 80   Weight: 121 kg (267 lb)   Height: 1.829 m (6')        Objective   Physical Exam  Constitutional:       Appearance: Normal appearance.   HENT:      Nose: Nose normal.   Neck:      Vascular: No carotid bruit.   Cardiovascular:      Rate and Rhythm: Normal rate. Rhythm irregularly irregular.      Pulses: Normal pulses.      Heart sounds: Normal heart sounds.   Pulmonary:      Effort: Pulmonary effort is normal.   Abdominal:      General: Bowel sounds are normal.      Palpations: Abdomen is soft.   Musculoskeletal:         General: Normal range of motion.      Cervical back: Normal range of motion.      Right lower leg: No edema.      Left lower leg: No edema.   Skin:     General: Skin is warm and dry.   Neurological:      General: No focal deficit present.      Mental Status: He is alert.   Psychiatric:         Mood and Affect: Mood normal.         Behavior: Behavior normal.         Thought Content: Thought content normal.         Judgment: Judgment normal.         Allergies  Penicillins and Statins-hmg-coa reductase inhibitors     Current Medications  Current Outpatient Medications   Medication Instructions    albuterol 90 mcg/actuation inhaler 2 puffs, Every 6 hours PRN    aspirin 81 mg, Daily    Breo Ellipta 200-25 mcg/dose inhaler 1 puff, Daily    buprenorphine-naloxone (Suboxone) 8-2 mg SL tablet DISSOLVE 1 (ONE) & ONE-HALF TABLETS UNDER THE TONGUE EVERY DAY for 30 days    digoxin (LANOXIN) 125 mcg, oral, Daily    famotidine (PEPCID) 20 mg, 2 times daily    hydroCHLOROthiazide (MICROZIDE) 12.5 mg, oral, Daily    losartan (COZAAR) 100 mg, oral, Daily    magnesium 200 mg tablet 1 tablet, Daily    metFORMIN  mg 24 hr tablet 1 tablet, 2 times daily (morning and late afternoon)     metoprolol tartrate (LOPRESSOR) 100 mg, oral, 2 times daily    multivitamin tablet 1 tablet, Daily    nitroglycerin (Nitrostat) 0.4 mg SL tablet Place under the tongue.    pravastatin (PRAVACHOL) 80 mg, oral, Nightly    semaglutide (Ozempic) 0.25 mg or 0.5 mg (2 mg/3 mL) pen injector Inject under the skin.    Spiriva Respimat 2.5 mcg/actuation inhaler INHALE 2 PUFFS BY MOUTH ONCE A DAY    Vascepa 2 g, oral, 2 times daily    vitamin B complex (Vitamins B Complex) tablet 1 tablet, Daily    warfarin (Jantoven) 4 mg tablet Take by mouth as directed by [facility]                        Assessment/Plan   1. PVD (peripheral vascular disease) (CMS-HCC)        2. CAD, multiple vessel  Follow Up In Cardiology      3. Mixed hyperlipidemia        4. Essential hypertension        5. Chronic atrial fibrillation (Multi)        6. BMI 35.0-35.9,adult        7. Shortness of breath        8. Chronic obstructive pulmonary disease, unspecified COPD type (Multi)        9. Obstructive sleep apnea syndrome        10. Current smoker                 Scribe Attestation  By signing my name below, Mayi VAZUQEZ LPN, Scribe   attest that this documentation has been prepared under the direction and in the presence of Kaylyn Osorio MD.     Provider Attestation - Scribe documentation    All medical record entries made by the Scribe were at my direction and personally dictated by me. I have reviewed the chart and agree that the record accurately reflects my personal performance of the history, physical exam, discussion and plan.

## 2025-05-02 NOTE — LETTER
May 2, 2025     Anuel Oliva DO  [address]  [place] [address]    Patient: Sony Wen   YOB: 1960   Date of Visit: 5/2/2025       Dear Dr. Anuel Oliva DO:    Thank you for referring Sony Wen to me for evaluation. Below are my notes for this consultation.  If you have questions, please do not hesitate to call me. I look forward to following your patient along with you.       Sincerely,     Kaylyn Osorio MD      CC: No Recipients  ______________________________________________________________________________________    Chief Complaint   Patient presents with   • Follow-up     6m Follow up for Coronary Artery Disease        Subjective   Sony Wen is a 64 y.o. male     HPI        Patient is here for follow-up to management for history of coronary artery disease with remote PCI to the right coronary artery, chronic atrial fibrillation, shortness of breath and tobacco use.  Since last time I saw him he denies any change in cardiac status or symptoms.  He continued to smoke and complain of class II shortness of breath.  He denies chest pain, lightheadedness, dizziness or syncope.  His recent lab noted and reviewed with him.    ASSESSMENT:      1. Coronary artery disease with prior PCI to the right coronary Coronary artery and disease affecting small branches stable no chest pain  2. Chronic atrial fibrillation elected for heart rate control on long-term anticoagulation  3. Active tobacco use and chronic obstructive pulmonary disease.  4. Hyperlipidemia, with history of intolerance to the potent statin but able to tolerate pravastatin.  But again he did not do his lab work as he was ordered  5. Sleep apnea, compliant with CPAP.  6. Obesity with recent weight loss  7. Hypertension controlled  8. Diabetes mellitus  9.  Long-term anticoagulation well-tolerated with no side effect.  He is on Coumadin but interested in switching to the DOACs  10.  Peripheral vascular  disease.  Recently underwent revascularization by his vascular surgeon           RECOMMENDATION:      1. The patient was counseled regarding smoking cessation.  2.  I advised the patient to switch from Coumadin to Pradaxa 150 twice daily  3. The patient was advised to lose weight to exercise.  4. The patient elected in the past to continue with heart rate control long-term anticoagulation  6. We'll see him back in the office in 8 months with an EKG   7. I advised the patient to notify me change in cardiac status or symptoms   Review of Systems   Cardiovascular:  Positive for dyspnea on exertion.   Respiratory:  Positive for shortness of breath.    All other systems reviewed and are negative.           Vitals:    05/02/25 0957   BP: 102/68   BP Location: Right arm   Patient Position: Sitting   Pulse: 80   Weight: 121 kg (267 lb)   Height: 1.829 m (6')        Objective   Physical Exam  Constitutional:       Appearance: Normal appearance.   HENT:      Nose: Nose normal.   Neck:      Vascular: No carotid bruit.   Cardiovascular:      Rate and Rhythm: Normal rate. Rhythm irregularly irregular.      Pulses: Normal pulses.      Heart sounds: Normal heart sounds.   Pulmonary:      Effort: Pulmonary effort is normal.   Abdominal:      General: Bowel sounds are normal.      Palpations: Abdomen is soft.   Musculoskeletal:         General: Normal range of motion.      Cervical back: Normal range of motion.      Right lower leg: No edema.      Left lower leg: No edema.   Skin:     General: Skin is warm and dry.   Neurological:      General: No focal deficit present.      Mental Status: He is alert.   Psychiatric:         Mood and Affect: Mood normal.         Behavior: Behavior normal.         Thought Content: Thought content normal.         Judgment: Judgment normal.         Allergies  Penicillins and Statins-hmg-coa reductase inhibitors     Current Medications  Current Outpatient Medications   Medication Instructions   •  albuterol 90 mcg/actuation inhaler 2 puffs, Every 6 hours PRN   • aspirin 81 mg, Daily   • Breo Ellipta 200-25 mcg/dose inhaler 1 puff, Daily   • buprenorphine-naloxone (Suboxone) 8-2 mg SL tablet DISSOLVE 1 (ONE) & ONE-HALF TABLETS UNDER THE TONGUE EVERY DAY for 30 days   • digoxin (LANOXIN) 125 mcg, oral, Daily   • famotidine (PEPCID) 20 mg, 2 times daily   • hydroCHLOROthiazide (MICROZIDE) 12.5 mg, oral, Daily   • losartan (COZAAR) 100 mg, oral, Daily   • magnesium 200 mg tablet 1 tablet, Daily   • metFORMIN  mg 24 hr tablet 1 tablet, 2 times daily (morning and late afternoon)   • metoprolol tartrate (LOPRESSOR) 100 mg, oral, 2 times daily   • multivitamin tablet 1 tablet, Daily   • nitroglycerin (Nitrostat) 0.4 mg SL tablet Place under the tongue.   • pravastatin (PRAVACHOL) 80 mg, oral, Nightly   • semaglutide (Ozempic) 0.25 mg or 0.5 mg (2 mg/3 mL) pen injector Inject under the skin.   • Spiriva Respimat 2.5 mcg/actuation inhaler INHALE 2 PUFFS BY MOUTH ONCE A DAY   • Vascepa 2 g, oral, 2 times daily   • vitamin B complex (Vitamins B Complex) tablet 1 tablet, Daily   • warfarin (Jantoven) 4 mg tablet Take by mouth as directed by [facility]                        Assessment/Plan   1. PVD (peripheral vascular disease) (CMS-HCC)        2. CAD, multiple vessel  Follow Up In Cardiology      3. Mixed hyperlipidemia        4. Essential hypertension        5. Chronic atrial fibrillation (Multi)        6. BMI 35.0-35.9,adult        7. Shortness of breath        8. Chronic obstructive pulmonary disease, unspecified COPD type (Multi)        9. Obstructive sleep apnea syndrome        10. Current smoker                 Scribe Attestation  By signing my name below, Mayi VAZQUEZ LPN, Scribe   attest that this documentation has been prepared under the direction and in the presence of Kaylyn Osorio MD.     Provider Attestation - Scribe documentation    All medical record entries made by the Scribe were  at my direction and personally dictated by me. I have reviewed the chart and agree that the record accurately reflects my personal performance of the history, physical exam, discussion and plan.

## 2025-05-02 NOTE — PATIENT INSTRUCTIONS
Please bring all medicines, vitamins, and herbal supplements with you when you come to the office.    Prescriptions will not be filled unless you are compliant with your follow up appointments or have a follow up appointment scheduled as per instruction of your physician. Refills should be requested at the time of your visit.     BMI was above normal measurement. Current weight: 121 kg (267 lb)  Weight change since last visit (-) denotes wt loss 2.6 lbs   Weight loss needed to achieve BMI 25: 83.1 Lbs  Weight loss needed to achieve BMI 30: 46.3 Lbs  Provided instructions on dietary changes.    9 months   Stop Coumadin  Start Pradaxa 150 mg one tablet 2 times daily

## 2025-05-05 DIAGNOSIS — I48.20 CHRONIC ATRIAL FIBRILLATION (MULTI): ICD-10-CM

## 2025-05-06 RX ORDER — RIVAROXABAN 20 MG/1
20 TABLET, FILM COATED ORAL DAILY
Qty: 90 TABLET | Refills: 3 | Status: SHIPPED | OUTPATIENT
Start: 2025-05-06

## 2025-06-02 DIAGNOSIS — E78.2 MIXED HYPERLIPIDEMIA: ICD-10-CM

## 2025-06-02 RX ORDER — ICOSAPENT ETHYL 1000 MG/1
2 CAPSULE ORAL 2 TIMES DAILY
Qty: 360 CAPSULE | Refills: 3 | Status: SHIPPED | OUTPATIENT
Start: 2025-06-02 | End: 2026-06-02

## 2025-07-01 DIAGNOSIS — E78.2 MIXED HYPERLIPIDEMIA: ICD-10-CM

## 2025-07-01 RX ORDER — ICOSAPENT ETHYL 1000 MG/1
2 CAPSULE ORAL 2 TIMES DAILY
Qty: 360 CAPSULE | Refills: 3 | Status: SHIPPED | OUTPATIENT
Start: 2025-07-01 | End: 2026-07-01

## 2026-02-04 ENCOUNTER — APPOINTMENT (OUTPATIENT)
Dept: CARDIOLOGY | Facility: CLINIC | Age: 66
End: 2026-02-04
Payer: COMMERCIAL